# Patient Record
Sex: FEMALE | Race: WHITE
[De-identification: names, ages, dates, MRNs, and addresses within clinical notes are randomized per-mention and may not be internally consistent; named-entity substitution may affect disease eponyms.]

---

## 2022-12-28 ENCOUNTER — HOSPITAL ENCOUNTER (OUTPATIENT)
Dept: HOSPITAL 47 - OR | Age: 22
Discharge: HOME | End: 2022-12-28
Payer: COMMERCIAL

## 2022-12-28 VITALS — HEART RATE: 59 BPM | SYSTOLIC BLOOD PRESSURE: 146 MMHG | DIASTOLIC BLOOD PRESSURE: 93 MMHG

## 2022-12-28 VITALS — BODY MASS INDEX: 26.6 KG/M2

## 2022-12-28 VITALS — RESPIRATION RATE: 14 BRPM | TEMPERATURE: 97 F

## 2022-12-28 DIAGNOSIS — Z79.2: ICD-10-CM

## 2022-12-28 DIAGNOSIS — G43.909: ICD-10-CM

## 2022-12-28 DIAGNOSIS — J33.9: ICD-10-CM

## 2022-12-28 DIAGNOSIS — J34.3: ICD-10-CM

## 2022-12-28 DIAGNOSIS — K58.9: ICD-10-CM

## 2022-12-28 DIAGNOSIS — J34.2: Primary | ICD-10-CM

## 2022-12-28 DIAGNOSIS — Z91.018: ICD-10-CM

## 2022-12-28 DIAGNOSIS — Z79.899: ICD-10-CM

## 2022-12-28 DIAGNOSIS — Z83.3: ICD-10-CM

## 2022-12-28 DIAGNOSIS — J35.2: ICD-10-CM

## 2022-12-28 DIAGNOSIS — J32.0: ICD-10-CM

## 2022-12-28 DIAGNOSIS — Z88.1: ICD-10-CM

## 2022-12-28 DIAGNOSIS — Z79.1: ICD-10-CM

## 2022-12-28 DIAGNOSIS — Z79.52: ICD-10-CM

## 2022-12-28 DIAGNOSIS — K21.9: ICD-10-CM

## 2022-12-28 DIAGNOSIS — J32.3: ICD-10-CM

## 2022-12-28 DIAGNOSIS — Z91.012: ICD-10-CM

## 2022-12-28 DIAGNOSIS — J32.2: ICD-10-CM

## 2022-12-28 DIAGNOSIS — J34.89: ICD-10-CM

## 2022-12-28 LAB — GLUCOSE BLD-MCNC: 97 MG/DL (ref 70–110)

## 2022-12-28 PROCEDURE — 31259 NSL/SINS NDSC SPHN TISS RMVL: CPT

## 2022-12-28 PROCEDURE — 31288 NASAL/SINUS ENDOSCOPY SURG: CPT

## 2022-12-28 PROCEDURE — 81025 URINE PREGNANCY TEST: CPT

## 2022-12-28 PROCEDURE — 30520 REPAIR OF NASAL SEPTUM: CPT

## 2022-12-28 PROCEDURE — 30140 RESECT INFERIOR TURBINATE: CPT

## 2022-12-28 PROCEDURE — 88305 TISSUE EXAM BY PATHOLOGIST: CPT

## 2022-12-28 PROCEDURE — 42831 REMOVAL OF ADENOIDS: CPT

## 2022-12-28 PROCEDURE — 31267 ENDOSCOPY MAXILLARY SINUS: CPT

## 2022-12-28 PROCEDURE — 88300 SURGICAL PATH GROSS: CPT

## 2022-12-28 RX ADMIN — OXYMETAZOLINE HCL PRN SPRAY: 0.05 SPRAY NASAL at 07:53

## 2022-12-28 RX ADMIN — OXYMETAZOLINE HCL PRN SPRAY: 0.05 SPRAY NASAL at 07:58

## 2022-12-28 RX ADMIN — OXYMETAZOLINE HCL PRN SPRAY: 0.05 SPRAY NASAL at 08:03

## 2022-12-28 RX ADMIN — OXYMETAZOLINE HCL PRN SPRAY: 0.05 SPRAY NASAL at 07:48

## 2022-12-28 RX ADMIN — OXYMETAZOLINE HCL PRN SPRAY: 0.05 SPRAY NASAL at 07:43

## 2022-12-28 NOTE — P.OP
Date of Procedure: 12/28/22


Preoperative Diagnosis: 


Deviated nasal septum


Inferior turbinate hypertrophy


Chronic sinusitis


Adenoid hypertrophy


Postoperative Diagnosis: 


Same


Procedure(s) Performed: 


Septoplasty


Outfracture and submucous resection of the inferior turbinates


Bilateral endoscopic sinus surgery including bilateral maxillary antrostomy with

removal of tissue from the maxillary sinuses, bilateral anterior and posterior 

ethmoidectomy and bilateral sphenoidotomy with removal of tissue from the 

sphenoid sinuses


Adenoidectomyadenoids cauterization


Anesthesia: CARLOS


Surgeon: Josep Loja


Estimated Blood Loss (ml): 10


Pathology: other (Nasal septal bone and cartilage and sinus contents)


Condition: stable


Disposition: PACU


Indications for Procedure: 


The 22-year-old white female whose had difficulties with nasal airway 

obstruction chronically, recurrent and chronic sinusitis with CT showing ev

idence of chronic sinusitis


Operative Findings: 


Septum is deviated bilaterally with bilateral spurs, inferior turbinate 

hypertrophy bilaterally.  Chronic sinusitis involving the maxillary ethmoid and 

sphenoid sinuses with mucosal thickening throughout these sinuses with small 

polyps in the maxillary ethmoid and sphenoid sinuses, adenoid hypertrophy 

moderate obstructing approximately 30% the nasopharynx elected to vaporized with

suction cautery


Description of Procedure: 


The patient was brought into the operative suite and placed in a supine po

sition.  The patient underwent induction of general anesthesia with oral 

endotracheal intubation without difficulty.  The patient was prepped and draped 

in the usual aseptic fashion with the orbits in the operating field for 

monitoring to the case and the computed tomography scan was on the computer 

screen for review throughout the case.


1% lidocaine with 1 :100,000 epinephrine was infused submucosally into both 

sides of the nasal septum as well as the lateral nasal wall and anterior tips of

the middle turbinates.  While this was taking vasoconstrictive effect the 

inferior turbinates were infractured with [place] elevator and partial submucous 

resection of the inferior turbinates was performed with a portion of the 

submucosal soft tissue and the inferior turbinate bone removed with Coblation 

device.  The inferior turbinates were then outfractured with the [place] elevator.


A left hemitransfixion incision was then made with the mucoperichondrial and 

mucoperiosteal flap on the left elevated.  The bony cartilaginous junction was 

disarticulated and the mucoperiosteal flap on the right was elevated.  Bony 

nasal septal deformities were removed with Francisco forceps and an inferior 

cartilaginous strip was removed leaving a full 1.5 cm caudal strut.  Checking 

intranasally this corrected the nasoseptal deformities and the hemitransfixion 

incision was closed with a running 4-0 chromic suture.


Full 0 endoscopic examination is performed bilaterally.  Beginning on the 

left, the middle turbinate was medialized.  The maxillary ostium was located 

with a ballpoint probe and an infundibulotomy was performed followed by 

uncinectomy.  The maxillary antrostomy was enlarged at the expense of the 

anterior and posterior fontanelle taking care anteriorly not to injure the 

lacrimal bone.  The maxillary sinus was evaluated with 30 and 70 endoscope 

.[Abnormal appearing tissue was removed from the maxillary sinus].  


Anterior and posterior ethmoidectomy were then performed from anterior to pos

terior to the level of the skull base.  The roof of the anterior ethmoid air 

cells were then cleaned from posterior to anterior using up-biting Blakesley 

forceps.


Sphenoidotomy was performed under 0 endoscopic examination with #8 suction and 

straight Blakesley forceps.  The sphenoid sinus was then explored with 0 

endoscope.[Abnormal tissue was removed from the sphenoid sinus].


Attention was then turned to the right where the procedures were followed as 

they had been on the left including medialization the middle turbinate 

infundibulotomy uncinectomy maxillary antrostomy with removal of tissue from the

maxillary sinus, anterior and posterior ethmoidectomy and sphenoidotomy with 

removal of tissue from the sphenoid sinus.  There were polyps in the middle 

meatus bilaterally as well as maxillary sinuses throughout the ethmoid sinuses 

and sphenoid sinuses.


[Nasopore nasal dressing was placed in the middle meatus bilaterally under 

direct visualization].  Bilateral Bland airway splints coated with bacitracin 

ointment were placed and sutured transseptally with a 4-0 nylon suture.


The McIvor mouth gag was placed and nasopharynx examined mirror exam and the 

adenoids were vaporized with suction cautery.  This ablated the adenoids with 

excellent hemostasis noted and the McIvor mouth gag was removed.  The patient 

was suctioned in oral gastric fashion and was allowed to emerge from general 

anesthesia having tolerated procedure well and was extubated in the operating 

suite and transferred to the postoperative recovery area in satisfactory 

condition.